# Patient Record
Sex: FEMALE | Race: OTHER | HISPANIC OR LATINO | URBAN - METROPOLITAN AREA
[De-identification: names, ages, dates, MRNs, and addresses within clinical notes are randomized per-mention and may not be internally consistent; named-entity substitution may affect disease eponyms.]

---

## 2020-10-01 ENCOUNTER — OFFICE VISIT (OUTPATIENT)
Dept: URBAN - METROPOLITAN AREA CLINIC 121 | Facility: CLINIC | Age: 60
End: 2020-10-01

## 2020-11-30 ENCOUNTER — OFFICE VISIT (OUTPATIENT)
Dept: URBAN - METROPOLITAN AREA CLINIC 121 | Facility: CLINIC | Age: 60
End: 2020-11-30

## 2021-08-18 ENCOUNTER — OFFICE VISIT (OUTPATIENT)
Dept: URBAN - METROPOLITAN AREA CLINIC 60 | Facility: CLINIC | Age: 61
End: 2021-08-18

## 2021-09-13 ENCOUNTER — OFFICE VISIT (OUTPATIENT)
Dept: URBAN - METROPOLITAN AREA CLINIC 63 | Facility: CLINIC | Age: 61
End: 2021-09-13

## 2021-09-14 ENCOUNTER — OFFICE VISIT (OUTPATIENT)
Dept: URBAN - METROPOLITAN AREA MEDICAL CENTER 14 | Facility: MEDICAL CENTER | Age: 61
End: 2021-09-14

## 2021-09-17 ENCOUNTER — OFFICE VISIT (OUTPATIENT)
Dept: URBAN - METROPOLITAN AREA CLINIC 63 | Facility: CLINIC | Age: 61
End: 2021-09-17

## 2021-09-20 ENCOUNTER — OFFICE VISIT (OUTPATIENT)
Dept: URBAN - METROPOLITAN AREA CLINIC 121 | Facility: CLINIC | Age: 61
End: 2021-09-20

## 2021-09-29 LAB
% SATURATION: (no result)
A-1 ANTITRYPSIN MUTATION: (no result)
ABSOLUTE BASOPHILS: (no result)
ABSOLUTE EOSINOPHILS: (no result)
ABSOLUTE LYMPHOCYTES: (no result)
ABSOLUTE MONOCYTES: (no result)
ABSOLUTE NEUTROPHILS: (no result)
ALBUMIN/GLOBULIN RATIO: (no result)
ALBUMIN: (no result)
ALKALINE PHOSPHATASE: (no result)
ALPHA FETOPROTEIN, TUMOR MARKER: (no result)
ALT: (no result)
AMMONIA (P): (no result)
ANACHOICE(R) SCREEN: POSITIVE
AST: (no result)
BASOPHILS: (no result)
BILIRUBIN, TOTAL: (no result)
BUN/CREATININE RATIO: (no result)
CALCIUM: (no result)
CARBON DIOXIDE: (no result)
CERULOPLASMIN: (no result)
CHLORIDE: (no result)
CREATININE: (no result)
DNA (DS) ANTIBODY: (no result)
EGFR AFRICAN AMERIC: (no result)
EGFR NON-AFR. AMERI: (no result)
EOSINOPHILS: (no result)
FERRITIN: (no result)
GLOBULIN: (no result)
GLUCOSE: (no result)
HCV RNA, QUANTITATIVE REAL TI: (no result)
HCV RNA, QUANTITATIVE REAL TIME: (no result)
HEMATOCRIT: (no result)
HEMOGLOBIN: (no result)
HEPATITIS A AB, TOTAL: REACTIVE
HEPATITIS A IGM: (no result)
HEPATITIS B CORE AB TOTAL: (no result)
HEPATITIS B CORE ANTIBODY (IGM): (no result)
HEPATITIS B SURFACE ANTIBODY QL: (no result)
HEPATITIS B SURFACE ANTIGEN: (no result)
IMMUNOGLOBULIN A: (no result)
IMMUNOGLOBULIN G: (no result)
IMMUNOGLOBULIN M: (no result)
INR: 1.5
IRON BINDING CAPACITY: (no result)
IRON, TOTAL: (no result)
LYMPHOCYTES: (no result)
Lab: (no result)
MCH: (no result)
MCHC: (no result)
MCV: (no result)
MITOCHONDRIAL AB SCREEN: NEGATIVE
MONOCYTES: (no result)
MPV: (no result)
NEUTROPHILS: (no result)
PLATELET COUNT: (no result)
POTASSIUM: (no result)
PROTEIN, TOTAL: (no result)
PT: (no result)
RDW: (no result)
RED BLOOD CELL COUNT: (no result)
REFERRING PHYSICIAN: (no result)
SMOOTH MUSCLE AB SCREEN: NEGATIVE
SODIUM: (no result)
UREA NITROGEN (BUN): (no result)
WHITE BLOOD CELL COUNT: (no result)

## 2021-10-11 ENCOUNTER — OFFICE VISIT (OUTPATIENT)
Dept: URBAN - METROPOLITAN AREA CLINIC 63 | Facility: CLINIC | Age: 61
End: 2021-10-11

## 2021-10-18 ENCOUNTER — OFFICE VISIT (OUTPATIENT)
Dept: URBAN - METROPOLITAN AREA CLINIC 63 | Facility: CLINIC | Age: 61
End: 2021-10-18

## 2021-10-18 ENCOUNTER — OFFICE VISIT (OUTPATIENT)
Dept: URBAN - METROPOLITAN AREA MEDICAL CENTER 14 | Facility: MEDICAL CENTER | Age: 61
End: 2021-10-18

## 2021-10-27 ENCOUNTER — OFFICE VISIT (OUTPATIENT)
Dept: URBAN - METROPOLITAN AREA CLINIC 63 | Facility: CLINIC | Age: 61
End: 2021-10-27

## 2022-01-10 ENCOUNTER — LAB OUTSIDE AN ENCOUNTER (OUTPATIENT)
Dept: URBAN - METROPOLITAN AREA CLINIC 121 | Facility: CLINIC | Age: 62
End: 2022-01-10

## 2022-01-17 ENCOUNTER — OFFICE VISIT (OUTPATIENT)
Dept: URBAN - METROPOLITAN AREA MEDICAL CENTER 14 | Facility: MEDICAL CENTER | Age: 62
End: 2022-01-17

## 2022-02-12 LAB
ABSOLUTE BASOPHILS: (no result)
ABSOLUTE EOSINOPHILS: (no result)
ABSOLUTE LYMPHOCYTES: (no result)
ABSOLUTE MONOCYTES: (no result)
ABSOLUTE NEUTROPHILS: (no result)
ALBUMIN/GLOBULIN RATIO: (no result)
ALBUMIN: (no result)
ALKALINE PHOSPHATASE: (no result)
ALPHA FETOPROTEIN, TUMOR MARKER: (no result)
ALT: (no result)
AMMONIA (P): (no result)
AST: (no result)
BASOPHILS: (no result)
BILIRUBIN, TOTAL: (no result)
BUN/CREATININE RATIO: (no result)
CALCIUM: (no result)
CARBON DIOXIDE: (no result)
CHLORIDE: (no result)
CREATININE: (no result)
EGFR AFRICAN AMERIC: (no result)
EGFR NON-AFR. AMERI: (no result)
EOSINOPHILS: (no result)
GLOBULIN: (no result)
GLUCOSE: (no result)
HEMATOCRIT: (no result)
HEMOGLOBIN: (no result)
INR: 1.4
LYMPHOCYTES: (no result)
MCH: (no result)
MCHC: (no result)
MCV: (no result)
MONOCYTES: (no result)
MPV: (no result)
NEUTROPHILS: (no result)
PLATELET COUNT: (no result)
POTASSIUM: (no result)
PROTEIN, TOTAL: (no result)
PT: (no result)
RDW: (no result)
RED BLOOD CELL COUNT: (no result)
SODIUM: (no result)
UREA NITROGEN (BUN): (no result)
WHITE BLOOD CELL COUNT: (no result)

## 2022-02-21 ENCOUNTER — OFFICE VISIT (OUTPATIENT)
Dept: URBAN - METROPOLITAN AREA MEDICAL CENTER 14 | Facility: MEDICAL CENTER | Age: 62
End: 2022-02-21

## 2022-03-21 ENCOUNTER — OFFICE VISIT (OUTPATIENT)
Dept: URBAN - METROPOLITAN AREA MEDICAL CENTER 14 | Facility: MEDICAL CENTER | Age: 62
End: 2022-03-21

## 2022-03-28 ENCOUNTER — OFFICE VISIT (OUTPATIENT)
Dept: URBAN - METROPOLITAN AREA CLINIC 63 | Facility: CLINIC | Age: 62
End: 2022-03-28

## 2022-05-09 ENCOUNTER — OFFICE VISIT (OUTPATIENT)
Dept: URBAN - METROPOLITAN AREA CLINIC 63 | Facility: CLINIC | Age: 62
End: 2022-05-09

## 2022-07-09 ENCOUNTER — TELEPHONE ENCOUNTER (OUTPATIENT)
Dept: URBAN - METROPOLITAN AREA CLINIC 121 | Facility: CLINIC | Age: 62
End: 2022-07-09

## 2022-07-09 RX ORDER — PHENYLPROPANOLAMINE HCL 75 MG
TABLET, EXTENDED RELEASE ORAL ONCE A DAY
Refills: 0 | OUTPATIENT
Start: 2021-10-11 | End: 2022-03-28

## 2022-07-09 RX ORDER — PANTOPRAZOLE SODIUM 40 MG/1
TABLET, DELAYED RELEASE ORAL ONCE A DAY
Refills: 0 | OUTPATIENT
Start: 2022-03-28 | End: 2022-05-09

## 2022-07-09 RX ORDER — PANTOPRAZOLE SODIUM 40 MG/1
TABLET, DELAYED RELEASE ORAL
Refills: 0 | OUTPATIENT
Start: 2021-09-17 | End: 2021-10-11

## 2022-07-09 RX ORDER — LISINOPRIL AND HYDROCHLOROTHIAZIDE TABLETS 10; 12.5 MG/1; MG/1
TABLET ORAL
Refills: 0 | OUTPATIENT
Start: 2021-08-09 | End: 2021-08-18

## 2022-07-09 RX ORDER — LEVOTHYROXINE SODIUM 150 UG/1
TABLET ORAL ONCE A DAY
Refills: 0 | OUTPATIENT
Start: 2022-03-28 | End: 2022-05-09

## 2022-07-09 RX ORDER — HYDROCORTISONE 1 %
CREAM (GRAM) TOPICAL
Refills: 0 | OUTPATIENT
Start: 2021-07-22 | End: 2021-08-18

## 2022-07-09 RX ORDER — CITALOPRAM 20 MG/1
TABLET ORAL ONCE A DAY
Refills: 0 | OUTPATIENT
Start: 2021-08-18 | End: 2021-09-17

## 2022-07-09 RX ORDER — SIMVASTATIN 20 MG/1
TABLET, FILM COATED ORAL ONCE A DAY
Refills: 0 | OUTPATIENT
Start: 2021-08-18 | End: 2021-09-17

## 2022-07-09 RX ORDER — PANTOPRAZOLE SODIUM 40 MG/1
TABLET, DELAYED RELEASE ORAL ONCE A DAY
Refills: 0 | OUTPATIENT
Start: 2021-10-11 | End: 2022-03-28

## 2022-07-09 RX ORDER — LEVOTHYROXINE SODIUM 150 MCG
TABLET ORAL ONCE A DAY
Refills: 0 | OUTPATIENT
Start: 2021-10-11 | End: 2022-03-28

## 2022-07-09 RX ORDER — AMITRIPTYLINE HYDROCHLORIDE 50 MG/1
TABLET, FILM COATED ORAL TAKE AS DIRECTED
Refills: 0 | OUTPATIENT
Start: 2021-08-18 | End: 2021-09-17

## 2022-07-09 RX ORDER — LEVOTHYROXINE SODIUM 150 UG/1
TABLET ORAL ONCE A DAY
Refills: 0 | OUTPATIENT
Start: 2021-08-18 | End: 2022-03-28

## 2022-07-09 RX ORDER — METFORMIN HCL 1000 MG/1
TABLET ORAL TWICE A DAY
Refills: 0 | OUTPATIENT
Start: 2021-08-18 | End: 2021-09-17

## 2022-07-09 RX ORDER — CITALOPRAM 20 MG/1
TABLET ORAL
Refills: 0 | OUTPATIENT
Start: 2021-08-09 | End: 2021-08-18

## 2022-07-09 RX ORDER — AMITRIPTYLINE HYDROCHLORIDE 25 MG/1
TABLET, FILM COATED ORAL
Refills: 0 | OUTPATIENT
Start: 2022-03-28 | End: 2022-05-09

## 2022-07-09 RX ORDER — LACTULOSE 10 G/15ML
30 UNIT QID SOLUTION ORAL
Refills: 0 | OUTPATIENT
Start: 2021-09-15 | End: 2021-09-17

## 2022-07-09 RX ORDER — SUCRALFATE 1 G/1
TWICE A DAY TABLET ORAL TWICE A DAY
Refills: 0 | OUTPATIENT
Start: 2022-03-28 | End: 2022-03-28

## 2022-07-09 RX ORDER — HYDROCORTISONE ACETATE 25 MG
ONCE A DAY SUPPOSITORY, RECTAL RECTAL ONCE A DAY
Refills: 0 | OUTPATIENT
Start: 2015-11-09 | End: 2016-01-04

## 2022-07-09 RX ORDER — PHENYLPROPANOLAMINE HCL 75 MG
TABLET, EXTENDED RELEASE ORAL ONCE A DAY
Refills: 0 | OUTPATIENT
Start: 2022-03-28 | End: 2022-05-09

## 2022-07-09 RX ORDER — FUROSEMIDE 20 MG/1
ONCE A DAY TABLET ORAL ONCE A DAY
Refills: 0 | OUTPATIENT
Start: 2021-09-17 | End: 2021-12-01

## 2022-07-09 RX ORDER — FOLIC ACID 1 MG/1
TABLET ORAL ONCE A DAY
Refills: 0 | OUTPATIENT
Start: 2022-03-28 | End: 2022-05-09

## 2022-07-09 RX ORDER — RIFAXIMIN 550 MG/1
TWICE A DAY TABLET ORAL TWICE A DAY
Refills: 0 | OUTPATIENT
Start: 2021-10-29 | End: 2021-12-01

## 2022-07-09 RX ORDER — LISINOPRIL AND HYDROCHLOROTHIAZIDE TABLETS 10; 12.5 MG/1; MG/1
TABLET ORAL ONCE A DAY
Refills: 0 | OUTPATIENT
Start: 2021-08-18 | End: 2021-09-17

## 2022-07-09 RX ORDER — RIFAXIMIN 550 MG/1
TWICE A DAY TABLET ORAL TWICE A DAY
Refills: 0 | OUTPATIENT
Start: 2021-12-15 | End: 2021-12-01

## 2022-07-09 RX ORDER — AMITRIPTYLINE HYDROCHLORIDE 25 MG/1
TABLET, FILM COATED ORAL
Refills: 0 | OUTPATIENT
Start: 2021-09-17 | End: 2021-10-11

## 2022-07-09 RX ORDER — SPIRONOLACTONE 25 MG/1
ONCE A DAY TABLET ORAL ONCE A DAY
Refills: 2 | OUTPATIENT
Start: 2021-08-18 | End: 2021-09-17

## 2022-07-09 RX ORDER — AMITRIPTYLINE HYDROCHLORIDE 25 MG/1
TABLET, FILM COATED ORAL
Refills: 0 | OUTPATIENT
Start: 2021-10-11 | End: 2022-03-28

## 2022-07-09 RX ORDER — RIFAXIMIN 550 MG/1
TWICE A DAY TABLET ORAL TWICE A DAY
Refills: 0 | OUTPATIENT
Start: 2021-09-17 | End: 2021-10-11

## 2022-07-09 RX ORDER — FUROSEMIDE 20 MG/1
ONCE A DAY TABLET ORAL ONCE A DAY
Refills: 1 | OUTPATIENT
Start: 2021-08-18 | End: 2021-09-17

## 2022-07-09 RX ORDER — LEVOTHYROXINE SODIUM 150 MCG
TABLET ORAL ONCE A DAY
Refills: 0 | OUTPATIENT
Start: 2022-03-28 | End: 2022-03-28

## 2022-07-09 RX ORDER — RIFAXIMIN 550 MG/1
TWICE A DAY TABLET ORAL TWICE A DAY
Refills: 0 | OUTPATIENT
Start: 2021-09-13 | End: 2021-09-17

## 2022-07-09 RX ORDER — RIFAXIMIN 550 MG/1
TWICE A DAY TABLET ORAL TWICE A DAY
Refills: 0 | OUTPATIENT
Start: 2021-12-02 | End: 2021-12-01

## 2022-07-09 RX ORDER — FOLIC ACID 1 MG/1
TABLET ORAL
Refills: 0 | OUTPATIENT
Start: 2021-09-17 | End: 2021-10-11

## 2022-07-09 RX ORDER — RIFAXIMIN 550 MG/1
TWICE A DAY TABLET ORAL TWICE A DAY
Refills: 0 | OUTPATIENT
Start: 2021-09-17 | End: 2021-09-17

## 2022-07-09 RX ORDER — LACTULOSE 10 G/15ML
SOLUTION ORAL TAKE AS DIRECTED
Refills: 0 | OUTPATIENT
Start: 2021-10-04 | End: 2021-10-16

## 2022-07-09 RX ORDER — FOLIC ACID 1 MG/1
TABLET ORAL ONCE A DAY
Refills: 0 | OUTPATIENT
Start: 2021-10-11 | End: 2022-03-28

## 2022-07-09 RX ORDER — CLONIDINE HYDROCHLORIDE 0.3 MG/1
TABLET ORAL
Refills: 0 | OUTPATIENT
Start: 2021-08-09 | End: 2021-09-17

## 2022-07-09 RX ORDER — PREDNISONE 10 MG/1
TABLET ORAL
Refills: 0 | OUTPATIENT
Start: 2021-06-22 | End: 2021-08-18

## 2022-07-10 ENCOUNTER — TELEPHONE ENCOUNTER (OUTPATIENT)
Dept: URBAN - METROPOLITAN AREA CLINIC 121 | Facility: CLINIC | Age: 62
End: 2022-07-10

## 2022-07-10 RX ORDER — AMITRIPTYLINE HYDROCHLORIDE 25 MG/1
TABLET, FILM COATED ORAL
Refills: 0 | Status: ACTIVE | COMMUNITY
Start: 2022-05-09

## 2022-07-10 RX ORDER — PHENYLPROPANOLAMINE HCL 75 MG
TABLET, EXTENDED RELEASE ORAL ONCE A DAY
Refills: 0 | Status: ACTIVE | COMMUNITY
Start: 2022-05-09

## 2022-07-10 RX ORDER — RIFAXIMIN 550 MG/1
TWICE A DAY TABLET ORAL TWICE A DAY
Refills: 0 | Status: ACTIVE | COMMUNITY
Start: 2021-12-09

## 2022-07-10 RX ORDER — RIFAXIMIN 550 MG/1
TWICE A DAY TABLET ORAL TWICE A DAY
Refills: 0 | Status: ACTIVE | COMMUNITY
Start: 2021-12-21

## 2022-07-10 RX ORDER — SUCRALFATE 1 G/1
TWICE A DAY TABLET ORAL TWICE A DAY
Refills: 0 | Status: ACTIVE | COMMUNITY
Start: 2022-03-28

## 2022-07-10 RX ORDER — LEVOTHYROXINE SODIUM 125 UG/1
TABLET ORAL ONCE A DAY
Refills: 0 | Status: ACTIVE | COMMUNITY
Start: 2022-05-09

## 2022-07-10 RX ORDER — LACTULOSE 10 G/15ML
TAKE 15 ML TWICE A DAY SOLUTION ORAL TWICE A DAY
Refills: 0 | Status: ACTIVE | COMMUNITY
Start: 2021-08-18

## 2022-07-10 RX ORDER — SPIRONOLACTONE 25 MG/1
ONCE A DAY TABLET ORAL ONCE A DAY
Refills: 0 | Status: ACTIVE | COMMUNITY
Start: 2022-03-28

## 2022-07-10 RX ORDER — LACTULOSE 10 G/15ML
TAKE 30 ML TWICE A DAY SOLUTION ORAL TWICE A DAY
Refills: 0 | Status: ACTIVE | COMMUNITY
Start: 2021-09-17

## 2022-07-10 RX ORDER — PANTOPRAZOLE SODIUM 40 MG/1
TABLET, DELAYED RELEASE ORAL ONCE A DAY
Refills: 0 | Status: ACTIVE | COMMUNITY
Start: 2022-05-09

## 2022-07-10 RX ORDER — FUROSEMIDE 20 MG/1
ONCE A DAY TABLET ORAL ONCE A DAY
Refills: 0 | Status: ACTIVE | COMMUNITY
Start: 2022-03-28

## 2022-07-10 RX ORDER — HYDROCORTISONE ACETATE 25 MG
ONCE A DAY SUPPOSITORY, RECTAL RECTAL ONCE A DAY
Refills: 3 | Status: ACTIVE | COMMUNITY
Start: 2016-01-04

## 2022-07-10 RX ORDER — RIFAXIMIN 550 MG/1
TWICE A DAY TABLET ORAL TWICE A DAY
Refills: 0 | Status: ACTIVE | COMMUNITY
Start: 2021-10-29

## 2022-07-10 RX ORDER — FUROSEMIDE 20 MG/1
ONCE A DAY TABLET ORAL ONCE A DAY
Refills: 0 | Status: ACTIVE | COMMUNITY
Start: 2021-12-01

## 2022-07-10 RX ORDER — SPIRONOLACTONE 25 MG/1
ONCE A DAY TABLET ORAL ONCE A DAY
Refills: 0 | Status: ACTIVE | COMMUNITY
Start: 2021-09-17

## 2022-07-10 RX ORDER — FOLIC ACID 1 MG/1
TABLET ORAL ONCE A DAY
Refills: 0 | Status: ACTIVE | COMMUNITY
Start: 2022-05-09

## 2022-09-02 ENCOUNTER — TELEPHONE ENCOUNTER (OUTPATIENT)
Dept: URBAN - METROPOLITAN AREA CLINIC 63 | Facility: CLINIC | Age: 62
End: 2022-09-02

## 2022-09-20 ENCOUNTER — TELEPHONE ENCOUNTER (OUTPATIENT)
Dept: URBAN - METROPOLITAN AREA CLINIC 63 | Facility: CLINIC | Age: 62
End: 2022-09-20

## 2022-09-20 RX ORDER — SUCRALFATE 1 G/1
TWICE A DAY TABLET ORAL TWICE A DAY
Qty: 60 | Refills: 0

## 2022-09-27 ENCOUNTER — TELEPHONE ENCOUNTER (OUTPATIENT)
Dept: URBAN - METROPOLITAN AREA CLINIC 63 | Facility: CLINIC | Age: 62
End: 2022-09-27

## 2022-09-27 RX ORDER — SUCRALFATE 1 G/1
TWICE A DAY TABLET ORAL TWICE A DAY
Qty: 60 | Refills: 0
End: 2022-11-04

## 2022-10-31 ENCOUNTER — WEB ENCOUNTER (OUTPATIENT)
Dept: URBAN - METROPOLITAN AREA CLINIC 63 | Facility: CLINIC | Age: 62
End: 2022-10-31

## 2022-10-31 ENCOUNTER — OFFICE VISIT (OUTPATIENT)
Dept: URBAN - METROPOLITAN AREA CLINIC 63 | Facility: CLINIC | Age: 62
End: 2022-10-31
Payer: COMMERCIAL

## 2022-10-31 VITALS
HEIGHT: 62 IN | OXYGEN SATURATION: 99 % | DIASTOLIC BLOOD PRESSURE: 70 MMHG | BODY MASS INDEX: 22.74 KG/M2 | HEART RATE: 76 BPM | SYSTOLIC BLOOD PRESSURE: 124 MMHG | RESPIRATION RATE: 16 BRPM | TEMPERATURE: 97.6 F | WEIGHT: 123.6 LBS

## 2022-10-31 DIAGNOSIS — K74.60 HEPATIC CIRRHOSIS, UNSPECIFIED HEPATIC CIRRHOSIS TYPE, UNSPECIFIED WHETHER ASCITES PRESENT: ICD-10-CM

## 2022-10-31 PROCEDURE — 99214 OFFICE O/P EST MOD 30 MIN: CPT | Performed by: NURSE PRACTITIONER

## 2022-10-31 RX ORDER — LACTULOSE 10 G/15ML
15 ML SOLUTION ORAL
Qty: 1800 ML | Refills: 4 | OUTPATIENT
Start: 2022-11-03 | End: 2023-04-02

## 2022-10-31 RX ORDER — HYDROCORTISONE ACETATE 25 MG
ONCE A DAY SUPPOSITORY, RECTAL RECTAL ONCE A DAY
Refills: 3 | Status: ACTIVE | COMMUNITY
Start: 2016-01-04

## 2022-10-31 RX ORDER — SPIRONOLACTONE 25 MG/1
1 TABLET TABLET, FILM COATED ORAL ONCE A DAY
Qty: 90 TABLET | Refills: 2 | OUTPATIENT
Start: 2022-11-03 | End: 2023-07-31

## 2022-10-31 RX ORDER — PHENYLPROPANOLAMINE HCL 75 MG
TABLET, EXTENDED RELEASE ORAL ONCE A DAY
Refills: 0 | Status: ACTIVE | COMMUNITY
Start: 2022-05-09

## 2022-10-31 RX ORDER — SUCRALFATE 1 G/1
TWICE A DAY TABLET ORAL TWICE A DAY
Qty: 60 | Refills: 0 | Status: ACTIVE | COMMUNITY
End: 2022-11-04

## 2022-10-31 RX ORDER — PANTOPRAZOLE SODIUM 40 MG/1
TABLET, DELAYED RELEASE ORAL ONCE A DAY
Refills: 0 | Status: ACTIVE | COMMUNITY
Start: 2022-05-09

## 2022-10-31 RX ORDER — AMITRIPTYLINE HYDROCHLORIDE 25 MG/1
TABLET, FILM COATED ORAL
Refills: 0 | Status: ACTIVE | COMMUNITY
Start: 2022-05-09

## 2022-10-31 RX ORDER — FUROSEMIDE 20 MG/1
1 TABLET TABLET ORAL ONCE A DAY
Qty: 90 TABLET | Refills: 2 | OUTPATIENT
Start: 2022-11-03

## 2022-10-31 RX ORDER — SPIRONOLACTONE 25 MG/1
ONCE A DAY TABLET ORAL ONCE A DAY
Refills: 0 | Status: ACTIVE | COMMUNITY
Start: 2022-03-28

## 2022-10-31 RX ORDER — FOLIC ACID 1 MG/1
TABLET ORAL ONCE A DAY
Refills: 0 | Status: ACTIVE | COMMUNITY
Start: 2022-05-09

## 2022-10-31 RX ORDER — FUROSEMIDE 20 MG/1
ONCE A DAY TABLET ORAL ONCE A DAY
Refills: 0 | Status: ACTIVE | COMMUNITY
Start: 2021-12-01

## 2022-10-31 RX ORDER — RIFAXIMIN 550 MG/1
TWICE A DAY TABLET ORAL TWICE A DAY
Refills: 0 | Status: ACTIVE | COMMUNITY
Start: 2021-12-09

## 2022-10-31 RX ORDER — LEVOTHYROXINE SODIUM 125 UG/1
TABLET ORAL ONCE A DAY
Refills: 0 | Status: ACTIVE | COMMUNITY
Start: 2022-05-09

## 2022-10-31 RX ORDER — RIFAXIMIN 550 MG/1
1 TABLET TABLET ORAL TWICE A DAY
Qty: 180 TABLET | Refills: 2 | OUTPATIENT
Start: 2022-11-03 | End: 2023-07-31

## 2022-10-31 RX ORDER — AZATHIOPRINE
AS DIRECTED POWDER (GRAM) MISCELLANEOUS
Status: ACTIVE | COMMUNITY

## 2022-10-31 RX ORDER — LACTULOSE 10 G/15ML
TAKE 30 ML TWICE A DAY SOLUTION ORAL TWICE A DAY
Refills: 0 | Status: ACTIVE | COMMUNITY
Start: 2021-09-17

## 2022-10-31 NOTE — HPI-HPI
1/16 Patient is feeling well, is doing better with her constipation, with stools softener and diet, colonoscopy positive for internal hemorrhoids, one hyperplastic polyp and one tubular adenoma. 11/15 Patient comes for rectal bleeding, with occasional constipation, patient had a colonoscopy in 2009 in [place], as per patient was normal.  Patient with anemia will plan colonoscopy.   Patient is feeling well, is doing better with her constipation, with stools softener and diet, colonoscopy positive for internal hemorrhoids, one hyperplastic polyp and one tubular adenoma. 8/21 Patient presents to the emergency room 4 days ago complaining of lower abdominal pain and nausea.  Patient has medical history of hypertension, diabetes, and fatty liver,  an abdominal ultrasound that shows evidence of abnormal liver consistent with cirrhosis changes, and a small volume of ascites. Laboratories: Shows anemia hemoglobin 11.2, low sodium 134, elevated blood sugar 179, elevated , , total bilirubin 2.0, low albumin level 3.1, and protein total elevated 8.4. Patient also had a CT scan that showed similar findings. Her last colonoscopy was almost 6 years ago and shows evidence of internal hemorrhoids, a 5 mm tubular adenoma polyp into the cecum, and a 5 mm hyperplastic polyp into the descending colon. Today patient has a coherent conversation but, forgetful at times, looks very anxious, has lower extremity edema, abdomen is distended seems to have small volume of ascites.  Negative for symptoms of GI infection or acute GI bleeding.  Patient complain of feeling tired, sleepy she got loss drive into our office today. No Hx of alcohol abuse. Plan: Patient was instructed not to drive any vehicle for now, education about hepatic encephalopathy was given to her. We will start her on diuretic low-dose of furosemide 20 mg once a day and spironolactone 25 mg once a day.  Patient will have liver work-up which include laboratories below, liver Doppler, FibroScan.  EGD to rule out esophageal/gastric varices/portal gastropathy. Lactulose 15 mL  twice daily. Plan: Patient will have liver work-up, and surveillance colonoscopy, and EGD.  9/21 Patient here today accompanied by her daughter.  Patient recently was admitted to the intensive care unit [facility] with a diagnosis of acute metabolic encephalopathy. EGD showed evidence of portal gastropathy, hiatal hernia, esophageal varices grade 1, normal duodenum. Patient also underwent to diagnostic paracentesis, 0.9 L of ascites fluid was drainage, negative for SBP.  Patient was treated with cephalexin, antibiotic, lactulose, diuretics. Today patient is in good general state. I noted she is in better on her mental status, seems somewhat forgetful but, has a coherent conversation, no edemas, no sign of infection, no sign of acute GI bleeding at this time.  Positive for small amount of ascites. Plan: Continue Xifaxan, lactulose, spironolactone 25 mg with 20 mg of furosemide daily. Patient will complete FibroScan, liver Doppler, and laboratories to complete her liver work-up. With going to plan screening colonoscopy in 3 months.  10/21 FibroScan: Shows evidence of advanced fibrosis/cirrhosis (60 8.7K PA, 183 CAP score). Labs: Alpha-fetoprotein is negative, alpha-1 antitrypsin is negative, ammonia levels are normal 55, CBC platelet levels are normal 158, anemia hemoglobin 11.0 hematocrit 32.9.  liver enzymes, alkaline phosphatase, and bilirubin are elevated.  , ALT 59, bilirubin total 2.4, albumin 2.8 low, protein total 8.6.  Ferritin level 52, normal viral hepatitis panel negative, autoimmune hepatitis panel negative INR 1.5. Meld score: 16 EGD done a month ago shows evidence of esophageal varices grade I. CT liver 3 PH done 4 months ago shows evidence of early cirrhosis, negative for masses, minimal hiatal hernia. CT abdomen and pelvis with contrast done 2 months ago: Shows evidence of ascites and cirrhotic changes of the liver. Patient here today in good general state, she had normal for her conversation, no sign of acute GI bleeding, infection, edema, ascites. Plan: We will refer her to a tertiary center/hepatology center Continue Xifaxan 550 mg twice a day, furosemide, spironolactone, lactulose, patient on metoprolol. Surveillance esophageal varices EGD  3/22 Patient is here today in good general state, she had normal coherent conversation, no edema, no sign of infection, or GI bleeding.  Laboratory: Alpha-fetoprotein elevated 11.8.  Normal ammonia 57, CBC anemia hemoglobin 11.5, platelet count normal.  CMP: Normal blood sugar and kidney function.  Liver enzymes: , , alkaline phosphatase 353, bilirubin 1.8, low albumin 2.7, normal protein 7.2.  Meld score 10. Patient colonoscopy shows evidence of small internal hemorrhoids otherwise normal. EGD: Esophageal varices grade I /no banding needed. Portal gastropathy, small hiatal hernia, mild gastritis at the antrum with erosion, and normal duodenum. Biopsy results shows evidence of superficial fragment of benign gastric antral mucosa with no specific pathologic alteration, negative for H. pylori. Doppler ultrasound done on January 2022 shows evidence of liver cirrhosis with persistent small amount of ascites within the upper abdomen. No abnormality evident within the portal vessels. Plan: Patient with continue with diuretic, Xifaxan, will lower dose of lactulose to once a day, if patient tolerated we may DC it. Triphasic CT liver due to elevated alpha-fetoprotein. Keep well control of the diet, weight, sodium intake. Will refer to a hepatologist in [place]. 5/22 Patient here today in good general state.  She has a normal coherent conversation, there is no sign of edema, infection, ascites, or any GI bleeding.  Patient CT scanning abdomen and pelvis shows evidence of: No suspicious liver mass.  Liver cirrhosis, and changes in appearance of the left hepatic lobe, compared with a CT scan done in June 2021 with a wedge shaped region of atrophy and altered perfusion, of uncertain etiology.  The portal vein is currently patent, small volume of perihepatic ascites.  No significant sequela of portal hypertension.  Mild to moderate upper abdominal and retroperitoneal adenopathy, potentially related to chronic liver disease, although is more prominent than typically occur. Patient did visit [facility], she said will have a liver biopsy in a couple of week.  She also have follow-up laboratories to complete for them. She has an appointment for a PET scan ordered from her primary doctor. Plan: Patient will continue with lactulose and Xifaxan. We will follow-up her liver biopsy, laboratories, and PET scan. 11/22 Patient here today in good general state.  She has no GI complaints.  She is following her hip pathology in [place] hepathology center.  She said has a diagnosis of autoimmune hepatitis/liver cirrhosis.  She did a treatment with prednisone followed by treatment with azathioprine. Patient had normal: Conversation, no sign of GI bleeding, infection, edema, ascites.  Today accompanied by her  who simply noticed some mental confusion on her, at times.  Patient will continue with treatment Xifaxan, spironolactone, furosemide, lactulose.  We will obtain medical records from [place].

## 2022-11-01 ENCOUNTER — ERX REFILL RESPONSE (OUTPATIENT)
Dept: URBAN - METROPOLITAN AREA CLINIC 63 | Facility: CLINIC | Age: 62
End: 2022-11-01

## 2022-11-01 RX ORDER — SUCRALFATE 1 G/1
TWICE A DAY TABLET ORAL TWICE A DAY
Qty: 60 | Refills: 0 | OUTPATIENT

## 2022-11-01 RX ORDER — SUCRALFATE 1 G/1
1 TABLET ON AN EMPTY STOMACH TABLET ORAL TWICE A DAY
Qty: 60 | Refills: 1 | OUTPATIENT

## 2022-11-01 RX ORDER — SUCRALFATE 1 G/1
1 TABLET ON AN EMPTY STOMACH TABLET ORAL TWICE A DAY
Qty: 60 | Refills: 0 | OUTPATIENT

## 2022-11-02 ENCOUNTER — ERX REFILL RESPONSE (OUTPATIENT)
Dept: URBAN - METROPOLITAN AREA CLINIC 63 | Facility: CLINIC | Age: 62
End: 2022-11-02

## 2022-11-02 RX ORDER — SUCRALFATE 1 G/1
1 TABLET ON AN EMPTY STOMACH TABLET ORAL TWICE A DAY
Qty: 60 | Refills: 0 | OUTPATIENT

## 2022-11-02 RX ORDER — SUCRALFATE 1 G/1
1 TABLET ON AN EMPTY STOMACH TABLET ORAL TWICE A DAY
Qty: 60 TABLET | Refills: 0 | OUTPATIENT

## 2022-11-02 RX ORDER — SUCRALFATE 1 G/1
1 TABLET ON AN EMPTY STOMACH TABLET ORAL TWICE A DAY
Qty: 60 | Refills: 1 | OUTPATIENT

## 2022-11-03 PROBLEM — 19943007: Status: ACTIVE | Noted: 2022-10-31

## 2023-01-04 ENCOUNTER — TELEPHONE ENCOUNTER (OUTPATIENT)
Dept: URBAN - METROPOLITAN AREA CLINIC 63 | Facility: CLINIC | Age: 63
End: 2023-01-04

## 2023-02-24 LAB
A/G RATIO: 1.4
ABSOLUTE BASOPHILS: 39
ABSOLUTE EOSINOPHILS: 182
ABSOLUTE LYMPHOCYTES: 1931
ABSOLUTE MONOCYTES: 644
ABSOLUTE NEUTROPHILS: 2706
AFP, SERUM, TUMOR MARKER: 10.6
ALBUMIN: 4.2
ALKALINE PHOSPHATASE: 193
ALT (SGPT): 38
AST (SGOT): 47
BASOPHILS: 0.7
BILIRUBIN, TOTAL: 0.6
BUN/CREATININE RATIO: 27
BUN: 29
CALCIUM: 9.4
CARBON DIOXIDE, TOTAL: 24
CHLORIDE: 107
CREATININE: 1.09
EGFR: 56
EOSINOPHILS: 3.3
GLOBULIN, TOTAL: 2.9
GLUCOSE: 73
HEMATOCRIT: 33.3
HEMOGLOBIN: 10.5
INR: 1.2
LYMPHOCYTES: 35.1
MCH: 27.4
MCHC: 31.5
MCV: 86.9
MONOCYTES: 11.7
MPV: 11.9
NEUTROPHILS: 49.2
PLATELET COUNT: 112
POTASSIUM: 3.8
PROTEIN, TOTAL: 7.1
PT: 11.6
RDW: 16
RED BLOOD CELL COUNT: 3.83
SODIUM: 142
WHITE BLOOD CELL COUNT: 5.5

## 2023-03-13 ENCOUNTER — LAB OUTSIDE AN ENCOUNTER (OUTPATIENT)
Dept: URBAN - METROPOLITAN AREA CLINIC 63 | Facility: CLINIC | Age: 63
End: 2023-03-13

## 2023-03-13 ENCOUNTER — OFFICE VISIT (OUTPATIENT)
Dept: URBAN - METROPOLITAN AREA CLINIC 63 | Facility: CLINIC | Age: 63
End: 2023-03-13
Payer: COMMERCIAL

## 2023-03-13 VITALS
BODY MASS INDEX: 22.78 KG/M2 | WEIGHT: 123.8 LBS | DIASTOLIC BLOOD PRESSURE: 72 MMHG | HEIGHT: 62 IN | TEMPERATURE: 97.3 F | HEART RATE: 69 BPM | OXYGEN SATURATION: 99 % | SYSTOLIC BLOOD PRESSURE: 120 MMHG

## 2023-03-13 DIAGNOSIS — K74.69 OTHER CIRRHOSIS OF LIVER: ICD-10-CM

## 2023-03-13 DIAGNOSIS — K75.4 AUTOIMMUNE HEPATITIS: ICD-10-CM

## 2023-03-13 PROBLEM — 408335007: Status: ACTIVE | Noted: 2023-03-13

## 2023-03-13 PROCEDURE — 99214 OFFICE O/P EST MOD 30 MIN: CPT | Performed by: NURSE PRACTITIONER

## 2023-03-13 RX ORDER — SPIRONOLACTONE 25 MG/1
ONCE A DAY TABLET ORAL ONCE A DAY
Refills: 0 | Status: ACTIVE | COMMUNITY
Start: 2022-03-28

## 2023-03-13 RX ORDER — LACTULOSE 10 G/15ML
15 ML SOLUTION ORAL
Qty: 1800 ML | Refills: 4 | Status: ACTIVE | COMMUNITY
Start: 2022-11-03 | End: 2023-04-02

## 2023-03-13 RX ORDER — PANTOPRAZOLE SODIUM 40 MG/1
TABLET, DELAYED RELEASE ORAL ONCE A DAY
Refills: 0 | Status: ACTIVE | COMMUNITY
Start: 2022-05-09

## 2023-03-13 RX ORDER — FUROSEMIDE 20 MG/1
ONCE A DAY TABLET ORAL ONCE A DAY
Refills: 0 | Status: ACTIVE | COMMUNITY
Start: 2021-12-01

## 2023-03-13 RX ORDER — AZATHIOPRINE
AS DIRECTED POWDER (GRAM) MISCELLANEOUS
Status: ACTIVE | COMMUNITY

## 2023-03-13 RX ORDER — HYDROCORTISONE ACETATE 25 MG
ONCE A DAY SUPPOSITORY, RECTAL RECTAL ONCE A DAY
Refills: 3 | Status: ACTIVE | COMMUNITY
Start: 2016-01-04

## 2023-03-13 RX ORDER — FUROSEMIDE 20 MG/1
1 TABLET TABLET ORAL ONCE A DAY
Qty: 90 TABLET | Refills: 2 | Status: ACTIVE | COMMUNITY
Start: 2022-11-03

## 2023-03-13 RX ORDER — AMITRIPTYLINE HYDROCHLORIDE 25 MG/1
TABLET, FILM COATED ORAL
Refills: 0 | Status: ACTIVE | COMMUNITY
Start: 2022-05-09

## 2023-03-13 RX ORDER — LACTULOSE 10 G/15ML
15 ML AS NEEDED SOLUTION ORAL ONCE A DAY
Qty: 450 | OUTPATIENT
Start: 2023-03-13 | End: 2023-04-12

## 2023-03-13 RX ORDER — FOLIC ACID 1 MG/1
TABLET ORAL ONCE A DAY
Refills: 0 | Status: ACTIVE | COMMUNITY
Start: 2022-05-09

## 2023-03-13 RX ORDER — SPIRONOLACTONE 25 MG/1
1 TABLET TABLET, FILM COATED ORAL ONCE A DAY
Qty: 90 TABLET | Refills: 2 | Status: ACTIVE | COMMUNITY
Start: 2022-11-03 | End: 2023-07-31

## 2023-03-13 RX ORDER — SUCRALFATE 1 G/1
1 TABLET ON AN EMPTY STOMACH TABLET ORAL TWICE A DAY
Qty: 60 | Refills: 0 | Status: ACTIVE | COMMUNITY

## 2023-03-13 RX ORDER — RIFAXIMIN 550 MG/1
1 TABLET TABLET ORAL TWICE A DAY
Qty: 180 TABLET | Refills: 2 | Status: ACTIVE | COMMUNITY
Start: 2022-11-03 | End: 2023-07-31

## 2023-03-13 RX ORDER — URSODIOL 250 MG/1
1 TABLET WITH FOOD TABLET ORAL TWICE A DAY
Qty: 180 TABLET | Refills: 0 | OUTPATIENT
Start: 2023-03-13

## 2023-03-13 RX ORDER — PHENYLPROPANOLAMINE HCL 75 MG
TABLET, EXTENDED RELEASE ORAL ONCE A DAY
Refills: 0 | Status: ACTIVE | COMMUNITY
Start: 2022-05-09

## 2023-03-13 RX ORDER — LACTULOSE 10 G/15ML
TAKE 30 ML TWICE A DAY SOLUTION ORAL TWICE A DAY
Refills: 0 | Status: ACTIVE | COMMUNITY
Start: 2021-09-17

## 2023-03-13 RX ORDER — FUROSEMIDE 20 MG/1
1 TABLET TABLET ORAL ONCE A DAY
Qty: 30 | OUTPATIENT
Start: 2023-03-13

## 2023-03-13 RX ORDER — RIFAXIMIN 550 MG/1
TWICE A DAY TABLET ORAL TWICE A DAY
Refills: 0 | Status: ACTIVE | COMMUNITY
Start: 2021-12-09

## 2023-03-13 RX ORDER — RIFAXIMIN 550 MG/1
1 TABLET TABLET ORAL TWICE A DAY
Qty: 60 | OUTPATIENT
Start: 2023-03-13 | End: 2023-04-12

## 2023-03-13 RX ORDER — URSODIOL 250 MG/1
1 TABLET WITH FOOD TABLET ORAL TWICE A DAY
Status: ACTIVE | COMMUNITY

## 2023-03-13 RX ORDER — LEVOTHYROXINE SODIUM 125 UG/1
TABLET ORAL ONCE A DAY
Refills: 0 | Status: ACTIVE | COMMUNITY
Start: 2022-05-09

## 2023-03-13 NOTE — PHYSICAL EXAM NECK/THYROID:
Normal appearance, without tenderness upon palpation, no deformities, trachea midline, no masses , thyroid nontender.

## 2023-03-13 NOTE — PHYSICAL EXAM CHEST:
No lesions,  no deformities, chest wall non-tender,  no masses present, breathing is unlabored without, normal breath sounds.

## 2023-03-13 NOTE — PHYSICAL EXAM HENT:
Head,  normocephalic,  atraumatic,  Face, within normal limits,  Ears,  External ears within normal limits,  Nose/Nasopharynx,  External nose  normal appearance, no nasal discharge,  Mouth and Throat,  Oral cavity appearance normal. Mucosa normal. Lips,  Appearance normal

## 2023-03-13 NOTE — HPI-HPI
1/16 Patient is feeling well, is doing better with her constipation, with stools softener and diet, colonoscopy positive for internal hemorrhoids, one hyperplastic polyp and one tubular adenoma. 11/15 Patient comes for rectal bleeding, with occasional constipation, patient had a colonoscopy in 2009 in [place], as per patient was normal.  Patient with anemia will plan colonoscopy.   Patient is feeling well, is doing better with her constipation, with stools softener and diet, colonoscopy positive for internal hemorrhoids, one hyperplastic polyp and one tubular adenoma. 8/21 Patient presents to the emergency room 4 days ago complaining of lower abdominal pain and nausea.  Patient has medical history of hypertension, diabetes, and fatty liver,  an abdominal ultrasound that shows evidence of abnormal liver consistent with cirrhosis changes, and a small volume of ascites. Laboratories: Shows anemia hemoglobin 11.2, low sodium 134, elevated blood sugar 179, elevated , , total bilirubin 2.0, low albumin level 3.1, and protein total elevated 8.4. Patient also had a CT scan that showed similar findings. Her last colonoscopy was almost 6 years ago and shows evidence of internal hemorrhoids, a 5 mm tubular adenoma polyp into the cecum, and a 5 mm hyperplastic polyp into the descending colon. Today patient has a coherent conversation but, forgetful at times, looks very anxious, has lower extremity edema, abdomen is distended seems to have small volume of ascites.  Negative for symptoms of GI infection or acute GI bleeding.  Patient complain of feeling tired, sleepy she got loss drive into our office today. No Hx of alcohol abuse. Plan: Patient was instructed not to drive any vehicle for now, education about hepatic encephalopathy was given to her. We will start her on diuretic low-dose of furosemide 20 mg once a day and spironolactone 25 mg once a day.  Patient will have liver work-up which include laboratories below, liver Doppler, FibroScan.  EGD to rule out esophageal/gastric varices/portal gastropathy. Lactulose 15 mL  twice daily. Plan: Patient will have liver work-up, and surveillance colonoscopy, and EGD.  9/21 Patient here today accompanied by her daughter.  Patient recently was admitted to the intensive care unit [facility] with a diagnosis of acute metabolic encephalopathy. EGD showed evidence of portal gastropathy, hiatal hernia, esophageal varices grade 1, normal duodenum. Patient also underwent to diagnostic paracentesis, 0.9 L of ascites fluid was drainage, negative for SBP.  Patient was treated with cephalexin, antibiotic, lactulose, diuretics. Today patient is in good general state. I noted she is in better on her mental status, seems somewhat forgetful but, has a coherent conversation, no edemas, no sign of infection, no sign of acute GI bleeding at this time.  Positive for small amount of ascites. Plan: Continue Xifaxan, lactulose, spironolactone 25 mg with 20 mg of furosemide daily. Patient will complete FibroScan, liver Doppler, and laboratories to complete her liver work-up. With going to plan screening colonoscopy in 3 months.  10/21 FibroScan: Shows evidence of advanced fibrosis/cirrhosis (60 8.7K PA, 183 CAP score). Labs: Alpha-fetoprotein is negative, alpha-1 antitrypsin is negative, ammonia levels are normal 55, CBC platelet levels are normal 158, anemia hemoglobin 11.0 hematocrit 32.9.  Liver enzymes, alkaline phosphatase, and bilirubin are elevated.  , ALT 59, bilirubin total 2.4, albumin 2.8 low, protein total 8.6.  Ferritin level 52, normal viral hepatitis panel negative, autoimmune hepatitis panel negative INR 1.5. Meld score: 16 EGD done a month ago shows evidence of esophageal varices grade I. CT liver 3 PH done 4 months ago shows evidence of early cirrhosis, negative for masses, minimal hiatal hernia. CT abdomen and pelvis with contrast done 2 months ago: Shows evidence of ascites and cirrhotic changes of the liver. Patient here today in good general state, she had normal for her conversation, no sign of acute GI bleeding, infection, edema, ascites. Plan: We will refer her to a tertiary center/hepatology center Continue Xifaxan 550 mg twice a day, furosemide, spironolactone, lactulose, patient on metoprolol. Surveillance esophageal varices EGD  3/22 Patient is here today in good general state, she had normal coherent conversation, no edema, no sign of infection, or GI bleeding.  Laboratory: Alpha-fetoprotein elevated 11.8.  Normal ammonia 57, CBC anemia hemoglobin 11.5, platelet count normal.  CMP: Normal blood sugar and kidney function.  Liver enzymes: , , alkaline phosphatase 353, bilirubin 1.8, low albumin 2.7, normal protein 7.2.  Meld score 10. Patient colonoscopy shows evidence of small internal hemorrhoids otherwise normal. EGD: Esophageal varices grade I /no banding needed. Portal gastropathy, small hiatal hernia, mild gastritis at the antrum with erosion, and normal duodenum. Biopsy results shows evidence of superficial fragment of benign gastric antral mucosa with no specific pathologic alteration, negative for H. pylori. Doppler's ultrasound done on January 2022 shows evidence of liver cirrhosis with persistent small amount of ascites within the upper abdomen. No abnormality evident within the portal vessels. Plan: Patient with continue with diuretic, Xifaxan, will lower dose of lactulose to once a day, if patient tolerated we may DC it. Triphasic CT liver due to elevated alpha-fetoprotein. Keep well control of the diet, weight, sodium intake. Will refer to a hepatologist in [place]. 5/22 Patient here today in good general state.  She has a normal coherent conversation, there is no sign of edema, infection, ascites, or any GI bleeding.  Patient CT scanning abdomen and pelvis shows evidence of: No suspicious liver mass.  Liver cirrhosis, and changes in appearance of the left hepatic lobe, compared with a CT scan done in June 2021 with a wedge shaped region of atrophy and altered perfusion, of uncertain etiology.  The portal vein is currently patent, small volume of perihepatic ascites.  No significant sequela of portal hypertension.  Mild to moderate upper abdominal and retroperitoneal adenopathy, potentially related to chronic liver disease, although is more prominent than typically occur. Patient did visit [facility], she said will have a liver biopsy in a couple of weeks.  She also has follow-up laboratories to complete for them. She has an appointment for a PET scan ordered from her primary doctor. Plan: Patient will continue with lactulose and Xifaxan. We will follow-up her liver biopsy, laboratories, and PET scan. 11/22 Patient here today in good general state.  She has no GI complaints.  She is following her hip pathology in [place] hepatology center.  She said has a diagnosis of autoimmune hepatitis/liver cirrhosis.  She did a treatment with prednisone followed by treatment with azathioprine. Patient had normal: Conversation, no sign of GI bleeding, infection, edema, ascites.  Today accompanied by her  who simply noticed some mental confusion on her, at times.  Patient will continue with treatment Xifaxan, spironolactone, furosemide, lactulose.  We will obtain medical records from [place].  3/23 Patient is here today in a good general state, she has medical history of autoimmune hepatitis with liver cirrhosis.  She has been followed by her hepatology in [place].  She is doing treatment with azathioprine She was stated with a new treatment Ursodiol 200 mg twice a day. Azathioprine 50 mg dose was lowered to half and it will be DC'd soon.  Patient has a normal conversation, there is no sign of ascites, infection, or GI bleeding.  She will continue with her present treatment listed below.  She also will need a 4 phase MRI liver as a hepatocellular carcinoma surveillance protocol.  She also will complete laboratories.

## 2023-03-13 NOTE — PHYSICAL EXAM GASTROINTESTINAL
Abdomen: Soft, nontender, none distended, no guarding or rigidity, no masses palpable, normal bowel sounds, no hepatosplenomegaly.

## 2023-03-24 ENCOUNTER — ERX REFILL RESPONSE (OUTPATIENT)
Dept: URBAN - METROPOLITAN AREA CLINIC 63 | Facility: CLINIC | Age: 63
End: 2023-03-24

## 2023-03-24 RX ORDER — LACTULOSE 10 G/15ML
TAKE 15 ML BY MOUTH DAILY AS NEEDED SOLUTION ORAL
Qty: 1350 MILLILITER | Refills: 1 | OUTPATIENT

## 2023-03-31 ENCOUNTER — TELEPHONE ENCOUNTER (OUTPATIENT)
Dept: URBAN - METROPOLITAN AREA CLINIC 63 | Facility: CLINIC | Age: 63
End: 2023-03-31

## 2023-04-04 ENCOUNTER — ERX REFILL RESPONSE (OUTPATIENT)
Dept: URBAN - METROPOLITAN AREA CLINIC 63 | Facility: CLINIC | Age: 63
End: 2023-04-04

## 2023-04-04 RX ORDER — METOPROLOL SUCCINATE 25 MG/1
TAKE 1 CAPSULE BY MOUTH EVERY DAY FOR 30 DAYS CAPSULE, EXTENDED RELEASE ORAL
Qty: 90 CAPSULE | Refills: 1 | OUTPATIENT

## 2023-04-27 ENCOUNTER — TELEPHONE ENCOUNTER (OUTPATIENT)
Dept: URBAN - METROPOLITAN AREA CLINIC 64 | Facility: CLINIC | Age: 63
End: 2023-04-27

## 2023-05-15 ENCOUNTER — TELEPHONE ENCOUNTER (OUTPATIENT)
Dept: URBAN - METROPOLITAN AREA CLINIC 63 | Facility: CLINIC | Age: 63
End: 2023-05-15

## 2023-05-18 ENCOUNTER — TELEPHONE ENCOUNTER (OUTPATIENT)
Dept: URBAN - METROPOLITAN AREA CLINIC 63 | Facility: CLINIC | Age: 63
End: 2023-05-18

## 2023-07-03 ENCOUNTER — OFFICE VISIT (OUTPATIENT)
Dept: URBAN - METROPOLITAN AREA CLINIC 63 | Facility: CLINIC | Age: 63
End: 2023-07-03

## 2023-07-14 LAB
A/G RATIO: 1.4
AFP, SERUM: 5.9
AFP-L3%, SERUM: 10.2
ALBUMIN: 4.2
ALKALINE PHOSPHATASE: 151
ALT (SGPT): 34
AST (SGOT): 40
BILIRUBIN, TOTAL: 0.5
BUN/CREATININE RATIO: (no result)
BUN: 25
CALCIUM: 9.4
CARBON DIOXIDE, TOTAL: 28
CHLORIDE: 103
CHOL/HDLC RATIO: 2.7
CHOLESTEROL, TOTAL: 176
CREATININE: 0.8
EGFR: 82
GLOBULIN, TOTAL: 2.9
GLUCOSE: 78
HDL CHOLESTEROL: 65
INR: 1.2
LDL CHOLESTEROL CALC: 85
NON HDL CHOLESTEROL: 111
POTASSIUM: 4.1
PROTEIN, TOTAL: 7.1
PT: 12.2
SODIUM: 140
TRIGLYCERIDES: 154

## 2023-07-24 ENCOUNTER — ERX REFILL RESPONSE (OUTPATIENT)
Dept: URBAN - METROPOLITAN AREA CLINIC 63 | Facility: CLINIC | Age: 63
End: 2023-07-24

## 2023-07-24 RX ORDER — URSODIOL 250 MG/1
TAKE 1 TABLET BY MOUTH TWICE A DAY WITH FOOD FOR 90 DAYS TABLET ORAL
Qty: 180 TABLET | Refills: 0 | OUTPATIENT

## 2023-07-24 RX ORDER — URSODIOL 250 MG/1
1 TABLET WITH FOOD TABLET ORAL TWICE A DAY
Qty: 180 TABLET | Refills: 0 | OUTPATIENT

## 2023-07-27 ENCOUNTER — ERX REFILL RESPONSE (OUTPATIENT)
Dept: URBAN - METROPOLITAN AREA CLINIC 63 | Facility: CLINIC | Age: 63
End: 2023-07-27

## 2023-07-27 RX ORDER — URSODIOL 250 MG/1
TAKE 1 TABLET BY MOUTH TWICE A DAY WITH FOOD FOR 90 DAYS TABLET ORAL
Qty: 180 TABLET | Refills: 1 | OUTPATIENT

## 2023-07-31 ENCOUNTER — OFFICE VISIT (OUTPATIENT)
Dept: URBAN - METROPOLITAN AREA CLINIC 63 | Facility: CLINIC | Age: 63
End: 2023-07-31
Payer: COMMERCIAL

## 2023-07-31 ENCOUNTER — DASHBOARD ENCOUNTERS (OUTPATIENT)
Age: 63
End: 2023-07-31

## 2023-07-31 ENCOUNTER — WEB ENCOUNTER (OUTPATIENT)
Dept: URBAN - METROPOLITAN AREA CLINIC 63 | Facility: CLINIC | Age: 63
End: 2023-07-31

## 2023-07-31 VITALS
WEIGHT: 123 LBS | OXYGEN SATURATION: 99 % | BODY MASS INDEX: 22.63 KG/M2 | HEIGHT: 62 IN | HEART RATE: 84 BPM | SYSTOLIC BLOOD PRESSURE: 104 MMHG | TEMPERATURE: 96.3 F | DIASTOLIC BLOOD PRESSURE: 80 MMHG

## 2023-07-31 DIAGNOSIS — K74.69 OTHER CIRRHOSIS OF LIVER: ICD-10-CM

## 2023-07-31 DIAGNOSIS — I85.10 SECONDARY ESOPHAGEAL VARICES WITHOUT BLEEDING: ICD-10-CM

## 2023-07-31 DIAGNOSIS — K75.4 AUTOIMMUNE HEPATITIS: ICD-10-CM

## 2023-07-31 DIAGNOSIS — K31.9 GASTROPATHY: ICD-10-CM

## 2023-07-31 PROBLEM — 14223005: Status: ACTIVE | Noted: 2023-07-31

## 2023-07-31 PROCEDURE — 99214 OFFICE O/P EST MOD 30 MIN: CPT | Performed by: NURSE PRACTITIONER

## 2023-07-31 RX ORDER — AMITRIPTYLINE HYDROCHLORIDE 25 MG/1
TABLET, FILM COATED ORAL
Refills: 0 | Status: ACTIVE | COMMUNITY
Start: 2022-05-09

## 2023-07-31 RX ORDER — PHENYLPROPANOLAMINE HCL 75 MG
TABLET, EXTENDED RELEASE ORAL ONCE A DAY
Refills: 0 | Status: ACTIVE | COMMUNITY
Start: 2022-05-09

## 2023-07-31 RX ORDER — LACTULOSE 10 G/15ML
TAKE 15 ML BY MOUTH DAILY AS NEEDED SOLUTION ORAL
Qty: 1350 MILLILITER | Refills: 1 | Status: ACTIVE | COMMUNITY

## 2023-07-31 RX ORDER — PANTOPRAZOLE SODIUM 40 MG/1
TABLET, DELAYED RELEASE ORAL ONCE A DAY
Refills: 0 | Status: ACTIVE | COMMUNITY
Start: 2022-05-09

## 2023-07-31 RX ORDER — RIFAXIMIN 550 MG/1
1 TABLET TABLET ORAL TWICE A DAY
Qty: 180 TABLET | Refills: 0 | OUTPATIENT
Start: 2023-07-31 | End: 2023-10-29

## 2023-07-31 RX ORDER — SPIRONOLACTONE 25 MG/1
1 TABLET TABLET, FILM COATED ORAL ONCE A DAY
Qty: 90 TABLET | Refills: 0 | OUTPATIENT
Start: 2023-07-31 | End: 2023-10-29

## 2023-07-31 RX ORDER — URSODIOL 250 MG/1
TAKE 1 TABLET BY MOUTH TWICE A DAY WITH FOOD FOR 90 DAYS TABLET ORAL
Qty: 180 TABLET | Refills: 0 | Status: ACTIVE | COMMUNITY

## 2023-07-31 RX ORDER — LACTULOSE 10 G/15ML
15 ML SOLUTION ORAL
Qty: 1800 MILLILITER | Refills: 3 | OUTPATIENT
Start: 2023-07-31 | End: 2023-10-29

## 2023-07-31 RX ORDER — AZATHIOPRINE 50 MG/1
AS DIRECTED TABLET ORAL
Qty: 90 TABLETS | Refills: 0 | OUTPATIENT
Start: 2023-07-31

## 2023-07-31 RX ORDER — FUROSEMIDE 20 MG/1
1 TABLET TABLET ORAL ONCE A DAY
Qty: 90 TABLET | Refills: 2 | Status: ACTIVE | COMMUNITY
Start: 2022-11-03

## 2023-07-31 RX ORDER — SPIRONOLACTONE 25 MG/1
1 TABLET TABLET, FILM COATED ORAL ONCE A DAY
Qty: 90 TABLET | Refills: 2 | Status: ACTIVE | COMMUNITY
Start: 2022-11-03 | End: 2023-07-31

## 2023-07-31 RX ORDER — SPIRONOLACTONE 25 MG/1
ONCE A DAY TABLET ORAL ONCE A DAY
Refills: 0 | Status: ACTIVE | COMMUNITY
Start: 2022-03-28

## 2023-07-31 RX ORDER — FUROSEMIDE 20 MG/1
1 TABLET TABLET ORAL ONCE A DAY
Qty: 30 | Status: ACTIVE | COMMUNITY
Start: 2023-03-13

## 2023-07-31 RX ORDER — RIFAXIMIN 550 MG/1
TWICE A DAY TABLET ORAL TWICE A DAY
Refills: 0 | Status: ACTIVE | COMMUNITY
Start: 2021-12-09

## 2023-07-31 RX ORDER — METOPROLOL SUCCINATE 25 MG/1
1 TABLET TABLET, FILM COATED, EXTENDED RELEASE ORAL
Qty: 90 TABLETS | Refills: 0 | OUTPATIENT
Start: 2023-07-31

## 2023-07-31 RX ORDER — LACTULOSE 10 G/15ML
TAKE 30 ML TWICE A DAY SOLUTION ORAL TWICE A DAY
Refills: 0 | Status: ACTIVE | COMMUNITY
Start: 2021-09-17

## 2023-07-31 RX ORDER — AZATHIOPRINE
AS DIRECTED POWDER (GRAM) MISCELLANEOUS
Status: ACTIVE | COMMUNITY

## 2023-07-31 RX ORDER — METOPROLOL SUCCINATE 25 MG/1
TAKE 1 CAPSULE BY MOUTH EVERY DAY FOR 30 DAYS CAPSULE, EXTENDED RELEASE ORAL
Qty: 90 CAPSULE | Refills: 1 | Status: ACTIVE | COMMUNITY

## 2023-07-31 RX ORDER — FUROSEMIDE 20 MG/1
1 TABLET TABLET ORAL ONCE A DAY
Qty: 90 TABLET | Refills: 0 | OUTPATIENT
Start: 2023-07-31

## 2023-07-31 RX ORDER — RIFAXIMIN 550 MG/1
1 TABLET TABLET ORAL TWICE A DAY
Qty: 180 TABLET | Refills: 2 | Status: ACTIVE | COMMUNITY
Start: 2022-11-03 | End: 2023-07-31

## 2023-07-31 RX ORDER — LEVOTHYROXINE SODIUM 125 UG/1
TABLET ORAL ONCE A DAY
Refills: 0 | Status: ACTIVE | COMMUNITY
Start: 2022-05-09

## 2023-07-31 RX ORDER — FOLIC ACID 1 MG/1
TABLET ORAL ONCE A DAY
Refills: 0 | Status: ACTIVE | COMMUNITY
Start: 2022-05-09

## 2023-07-31 RX ORDER — HYDROCORTISONE ACETATE 25 MG
ONCE A DAY SUPPOSITORY, RECTAL RECTAL ONCE A DAY
Refills: 3 | Status: ACTIVE | COMMUNITY
Start: 2016-01-04

## 2023-07-31 RX ORDER — FUROSEMIDE 20 MG/1
ONCE A DAY TABLET ORAL ONCE A DAY
Refills: 0 | Status: ACTIVE | COMMUNITY
Start: 2021-12-01

## 2023-07-31 RX ORDER — SUCRALFATE 1 G/1
1 TABLET ON AN EMPTY STOMACH TABLET ORAL TWICE A DAY
Qty: 60 | Refills: 0 | Status: ACTIVE | COMMUNITY

## 2023-07-31 NOTE — HPI-HPI
1/16 Patient is feeling well, is doing better with her constipation, with stools softener and diet, colonoscopy positive for internal hemorrhoids, one hyperplastic polyp and one tubular adenoma. 11/15 Patient comes for rectal bleeding, with occasional constipation, patient had a colonoscopy in 2009 in [place], as per patient was normal.  Patient with anemia will plan colonoscopy.   Patient is feeling well, is doing better with her constipation, with stools softener and diet, colonoscopy positive for internal hemorrhoids, one hyperplastic polyp and one tubular adenoma. 8/21 Patient presents to the emergency room 4 days ago complaining of lower abdominal pain and nausea.  Patient has medical history of hypertension, diabetes, and fatty liver,  an abdominal ultrasound that shows evidence of abnormal liver consistent with cirrhosis changes, and a small volume of ascites. Laboratories: Shows anemia hemoglobin 11.2, low sodium 134, elevated blood sugar 179, elevated , , total bilirubin 2.0, low albumin level 3.1, and protein total elevated 8.4. Patient also had a CT scan that showed similar findings. Her last colonoscopy was almost 6 years ago and shows evidence of internal hemorrhoids, a 5 mm tubular adenoma polyp into the cecum, and a 5 mm hyperplastic polyp into the descending colon. Today patient has a coherent conversation but, forgetful at times, looks very anxious, has lower extremity edema, abdomen is distended seems to have small volume of ascites.  Negative for symptoms of GI infection or acute GI bleeding.  Patient complain of feeling tired, sleepy she got loss drive into our office today. No Hx of alcohol abuse. Plan: Patient was instructed not to drive any vehicle for now, education about hepatic encephalopathy was given to her. We will start her on diuretic low-dose of furosemide 20 mg once a day and spironolactone 25 mg once a day.  Patient will have liver work-up which include laboratories below, liver Doppler, FibroScan.  EGD to rule out esophageal/gastric varices/portal gastropathy. Lactulose 15 mL  twice daily. Plan: Patient will have liver work-up, and surveillance colonoscopy, and EGD.  9/21 Patient here today accompanied by her daughter.  Patient recently was admitted to the intensive care unit [facility] with a diagnosis of acute metabolic encephalopathy. EGD showed evidence of portal gastropathy, hiatal hernia, esophageal varices grade 1, normal duodenum. Patient also underwent to diagnostic paracentesis, 0.9 L of ascites fluid was drainage, negative for SBP.  Patient was treated with cephalexin, antibiotic, lactulose, diuretics. Today patient is in good general state. I noted she is in better on her mental status, seems somewhat forgetful but, has a coherent conversation, no edemas, no sign of infection, no sign of acute GI bleeding at this time.  Positive for small amount of ascites. Plan: Continue Xifaxan, lactulose, spironolactone 25 mg with 20 mg of furosemide daily. Patient will complete FibroScan, liver Doppler, and laboratories to complete her liver work-up. With going to plan screening colonoscopy in 3 months.  10/21 FibroScan: Shows evidence of advanced fibrosis/cirrhosis (60 8.7K PA, 183 CAP score). Labs: Alpha-fetoprotein is negative, alpha-1 antitrypsin is negative, ammonia levels are normal 55, CBC platelet levels are normal 158, anemia hemoglobin 11.0 hematocrit 32.9.  Liver enzymes, alkaline phosphatase, and bilirubin are elevated.  , ALT 59, bilirubin total 2.4, albumin 2.8 low, protein total 8.6.  Ferritin level 52, normal viral hepatitis panel negative, autoimmune hepatitis panel negative INR 1.5. Meld score: 16 EGD done a month ago shows evidence of esophageal varices grade I. CT liver 3 PH done 4 months ago shows evidence of early cirrhosis, negative for masses, minimal hiatal hernia. CT abdomen and pelvis with contrast done 2 months ago: Shows evidence of ascites and cirrhotic changes of the liver. Patient here today in good general state, she had normal for her conversation, no sign of acute GI bleeding, infection, edema, ascites. Plan: We will refer her to a tertiary center/hepatology center Continue Xifaxan 550 mg twice a day, furosemide, spironolactone, lactulose, patient on metoprolol. Surveillance esophageal varices EGD  3/22 Patient is here today in good general state, she had normal coherent conversation, no edema, no sign of infection, or GI bleeding.  Laboratory: Alpha-fetoprotein elevated 11.8.  Normal ammonia 57, CBC anemia hemoglobin 11.5, platelet count normal.  CMP: Normal blood sugar and kidney function.  Liver enzymes: , , alkaline phosphatase 353, bilirubin 1.8, low albumin 2.7, normal protein 7.2.  Meld score 10. Patient colonoscopy shows evidence of small internal hemorrhoids otherwise normal. EGD: Esophageal varices grade I /no banding needed. Portal gastropathy, small hiatal hernia, mild gastritis at the antrum with erosion, and normal duodenum. Biopsy results shows evidence of superficial fragment of benign gastric antral mucosa with no specific pathologic alteration, negative for H. pylori. Doppler's ultrasound done on January 2022 shows evidence of liver cirrhosis with persistent small amount of ascites within the upper abdomen. No abnormality evident within the portal vessels. Plan: Patient with continue with diuretic, Xifaxan, will lower dose of lactulose to once a day, if patient tolerated we may DC it. Triphasic CT liver due to elevated alpha-fetoprotein. Keep well control of the diet, weight, sodium intake. Will refer to a hepatologist in [place]. 5/22 Patient here today in good general state.  She has a normal coherent conversation, there is no sign of edema, infection, ascites, or any GI bleeding.  Patient CT scanning abdomen and pelvis shows evidence of: No suspicious liver mass.  Liver cirrhosis, and changes in appearance of the left hepatic lobe, compared with a CT scan done in June 2021 with a wedge shaped region of atrophy and altered perfusion, of uncertain etiology.  The portal vein is currently patent, small volume of perihepatic ascites.  No significant sequela of portal hypertension.  Mild to moderate upper abdominal and retroperitoneal adenopathy, potentially related to chronic liver disease, although is more prominent than typically occur. Patient did visit [facility], she said will have a liver biopsy in a couple of weeks.  She also has follow-up laboratories to complete for them. She has an appointment for a PET scan ordered from her primary doctor. Plan: Patient will continue with lactulose and Xifaxan. We will follow-up her liver biopsy, laboratories, and PET scan. 11/22 Patient here today in good general state.  She has no GI complaints.  She is following her hip pathology in [place] hepatology center.  She said has a diagnosis of autoimmune hepatitis/liver cirrhosis.  She did a treatment with prednisone followed by treatment with azathioprine. Patient had normal: Conversation, no sign of GI bleeding, infection, edema, ascites.  Today accompanied by her  who simply noticed some mental confusion on her, at times.  Patient will continue with treatment Xifaxan, spironolactone, furosemide, lactulose.  We will obtain medical records from [place].  3/23 Patient is here today in a good general state, she has medical history of autoimmune hepatitis with liver cirrhosis.  She has been followed by her hepatology in [place].  She is doing treatment with azathioprine She was stated with a new treatment Ursodiol 200 mg twice a day. Azathioprine 50 mg dose was lowered to half and it will be DC'd soon.  Patient has a normal conversation, there is no sign of ascites, infection, or GI bleeding.  She will continue with her present treatment listed below.  She also will need a 4 phase MRI liver as a hepatocellular carcinoma surveillance protocol.  She also will complete laboratories. 7/23 Today patient is in good general state, she has normal coherent conversation, she has no GI complaints today.  There is no sign of ascites, edema, GI infection, or GI bleeding.  Patient is following with hepatology in [place] she is on Xifaxan 550 mg twice a day, lactulose, azathioprine 50 mg daily and Ursodiol 200 mg twice a day.  Alpha-fetoprotein trending up 10.6, normal kidney function and liver enzymes trending down still slightly elevated AST 38, ALT 32.  PT/INR 1.2/12.3 Hg 13.3, platelets 123,  Patient will see her hepatologist in 2 weeks.  MRI liver shows evidence of cirrhosis without suspicious liver mass, no significant sequela or portal hypertension. Continue follow-up with her hepatology and continue present treatment.  We will do MRI liver in 6 months. EGD cirrhosis/ esophageal varices.

## 2023-08-02 ENCOUNTER — LAB OUTSIDE AN ENCOUNTER (OUTPATIENT)
Dept: URBAN - METROPOLITAN AREA CLINIC 63 | Facility: CLINIC | Age: 63
End: 2023-08-02

## 2023-08-07 ENCOUNTER — LAB OUTSIDE AN ENCOUNTER (OUTPATIENT)
Dept: URBAN - METROPOLITAN AREA CLINIC 63 | Facility: CLINIC | Age: 63
End: 2023-08-07

## 2023-08-08 ENCOUNTER — TELEPHONE ENCOUNTER (OUTPATIENT)
Dept: URBAN - METROPOLITAN AREA CLINIC 64 | Facility: CLINIC | Age: 63
End: 2023-08-08

## 2023-09-20 ENCOUNTER — TELEPHONE ENCOUNTER (OUTPATIENT)
Dept: URBAN - METROPOLITAN AREA CLINIC 3 | Facility: CLINIC | Age: 63
End: 2023-09-20

## 2023-09-21 ENCOUNTER — OUT OF OFFICE VISIT (OUTPATIENT)
Dept: URBAN - METROPOLITAN AREA SURGERY CENTER 4 | Facility: SURGERY CENTER | Age: 63
End: 2023-09-21
Payer: COMMERCIAL

## 2023-09-21 ENCOUNTER — CLAIMS CREATED FROM THE CLAIM WINDOW (OUTPATIENT)
Dept: URBAN - METROPOLITAN AREA CLINIC 4 | Facility: CLINIC | Age: 63
End: 2023-09-21
Payer: COMMERCIAL

## 2023-09-21 DIAGNOSIS — K74.60 CIRRHOSIS, UNSPECIFIED: ICD-10-CM

## 2023-09-21 DIAGNOSIS — K29.50 CHRONIC GASTRITIS WITHOUT BLEEDING: ICD-10-CM

## 2023-09-21 DIAGNOSIS — K44.9 DIAPHRAGMATIC HERNIA WITHOUT OBSTRUCTION OR GANGRENE: ICD-10-CM

## 2023-09-21 DIAGNOSIS — K74.60 CIRRHOSIS: ICD-10-CM

## 2023-09-21 DIAGNOSIS — K29.70 CHRONIC GASTRITIS: ICD-10-CM

## 2023-09-21 DIAGNOSIS — K29.70 GASTRITIS, UNSPECIFIED, WITHOUT BLEEDING: ICD-10-CM

## 2023-09-21 DIAGNOSIS — Z12.11 COLON CANCER SCREENING (HIGH RISK): ICD-10-CM

## 2023-09-21 DIAGNOSIS — K44.9 HIATAL HERNIA: ICD-10-CM

## 2023-09-21 DIAGNOSIS — R19.8 OTHER SPECIFIED SYMPTOMS AND SIGNS INVOLVING THE DIGESTIVE SYSTEM AND ABDOMEN: ICD-10-CM

## 2023-09-21 PROCEDURE — 88305 TISSUE EXAM BY PATHOLOGIST: CPT | Performed by: PATHOLOGY

## 2023-09-21 PROCEDURE — 00731 ANES UPR GI NDSC PX NOS: CPT | Performed by: NURSE ANESTHETIST, CERTIFIED REGISTERED

## 2023-09-21 PROCEDURE — 43239 EGD BIOPSY SINGLE/MULTIPLE: CPT | Performed by: INTERNAL MEDICINE

## 2023-09-21 PROCEDURE — 88312 SPECIAL STAINS GROUP 1: CPT | Performed by: PATHOLOGY

## 2023-09-21 RX ORDER — AZATHIOPRINE
AS DIRECTED POWDER (GRAM) MISCELLANEOUS
Status: ACTIVE | COMMUNITY

## 2023-09-21 RX ORDER — SPIRONOLACTONE 25 MG/1
1 TABLET TABLET, FILM COATED ORAL ONCE A DAY
Qty: 90 TABLET | Refills: 0 | Status: ACTIVE | COMMUNITY
Start: 2023-07-31 | End: 2023-10-29

## 2023-09-21 RX ORDER — LEVOTHYROXINE SODIUM 125 UG/1
TABLET ORAL ONCE A DAY
Refills: 0 | Status: ACTIVE | COMMUNITY
Start: 2022-05-09

## 2023-09-21 RX ORDER — RIFAXIMIN 550 MG/1
1 TABLET TABLET ORAL TWICE A DAY
Qty: 180 TABLET | Refills: 0 | Status: ACTIVE | COMMUNITY
Start: 2023-07-31 | End: 2023-10-29

## 2023-09-21 RX ORDER — LACTULOSE 10 G/15ML
TAKE 30 ML TWICE A DAY SOLUTION ORAL TWICE A DAY
Refills: 0 | Status: ACTIVE | COMMUNITY
Start: 2021-09-17

## 2023-09-21 RX ORDER — HYDROCORTISONE ACETATE 25 MG
ONCE A DAY SUPPOSITORY, RECTAL RECTAL ONCE A DAY
Refills: 3 | Status: ACTIVE | COMMUNITY
Start: 2016-01-04

## 2023-09-21 RX ORDER — PHENYLPROPANOLAMINE HCL 75 MG
TABLET, EXTENDED RELEASE ORAL ONCE A DAY
Refills: 0 | Status: ACTIVE | COMMUNITY
Start: 2022-05-09

## 2023-09-21 RX ORDER — SUCRALFATE 1 G/1
1 TABLET ON AN EMPTY STOMACH TABLET ORAL TWICE A DAY
Qty: 60 | Refills: 0 | Status: ACTIVE | COMMUNITY

## 2023-09-21 RX ORDER — FUROSEMIDE 20 MG/1
1 TABLET TABLET ORAL ONCE A DAY
Qty: 90 TABLET | Refills: 2 | Status: ACTIVE | COMMUNITY
Start: 2022-11-03

## 2023-09-21 RX ORDER — LACTULOSE 10 G/15ML
15 ML SOLUTION ORAL
Qty: 1800 MILLILITER | Refills: 3 | Status: ACTIVE | COMMUNITY
Start: 2023-07-31 | End: 2023-10-29

## 2023-09-21 RX ORDER — AZATHIOPRINE 50 MG/1
AS DIRECTED TABLET ORAL
Qty: 90 TABLETS | Refills: 0 | Status: ACTIVE | COMMUNITY
Start: 2023-07-31

## 2023-09-21 RX ORDER — AMITRIPTYLINE HYDROCHLORIDE 25 MG/1
TABLET, FILM COATED ORAL
Refills: 0 | Status: ACTIVE | COMMUNITY
Start: 2022-05-09

## 2023-09-21 RX ORDER — PANTOPRAZOLE SODIUM 40 MG/1
TABLET, DELAYED RELEASE ORAL ONCE A DAY
Refills: 0 | Status: ACTIVE | COMMUNITY
Start: 2022-05-09

## 2023-09-21 RX ORDER — METOPROLOL SUCCINATE 25 MG/1
TAKE 1 CAPSULE BY MOUTH EVERY DAY FOR 30 DAYS CAPSULE, EXTENDED RELEASE ORAL
Qty: 90 CAPSULE | Refills: 1 | Status: ACTIVE | COMMUNITY

## 2023-09-21 RX ORDER — FOLIC ACID 1 MG/1
TABLET ORAL ONCE A DAY
Refills: 0 | Status: ACTIVE | COMMUNITY
Start: 2022-05-09

## 2023-09-21 RX ORDER — LACTULOSE 10 G/15ML
TAKE 15 ML BY MOUTH DAILY AS NEEDED SOLUTION ORAL
Qty: 1350 MILLILITER | Refills: 1 | Status: ACTIVE | COMMUNITY

## 2023-09-21 RX ORDER — URSODIOL 250 MG/1
TAKE 1 TABLET BY MOUTH TWICE A DAY WITH FOOD FOR 90 DAYS TABLET ORAL
Qty: 180 TABLET | Refills: 0 | Status: ACTIVE | COMMUNITY

## 2023-09-21 RX ORDER — METOPROLOL SUCCINATE 25 MG/1
1 TABLET TABLET, FILM COATED, EXTENDED RELEASE ORAL
Qty: 90 TABLETS | Refills: 0 | Status: ACTIVE | COMMUNITY
Start: 2023-07-31

## 2023-09-21 RX ORDER — SPIRONOLACTONE 25 MG/1
ONCE A DAY TABLET ORAL ONCE A DAY
Refills: 0 | Status: ACTIVE | COMMUNITY
Start: 2022-03-28

## 2023-09-21 RX ORDER — FUROSEMIDE 20 MG/1
ONCE A DAY TABLET ORAL ONCE A DAY
Refills: 0 | Status: ACTIVE | COMMUNITY
Start: 2021-12-01

## 2023-09-21 RX ORDER — RIFAXIMIN 550 MG/1
TWICE A DAY TABLET ORAL TWICE A DAY
Refills: 0 | Status: ACTIVE | COMMUNITY
Start: 2021-12-09

## 2023-09-21 RX ORDER — FUROSEMIDE 20 MG/1
1 TABLET TABLET ORAL ONCE A DAY
Qty: 30 | Status: ACTIVE | COMMUNITY
Start: 2023-03-13

## 2023-09-21 RX ORDER — FUROSEMIDE 20 MG/1
1 TABLET TABLET ORAL ONCE A DAY
Qty: 90 TABLET | Refills: 0 | Status: ACTIVE | COMMUNITY
Start: 2023-07-31

## 2023-10-18 ENCOUNTER — P2P PATIENT RECORD (OUTPATIENT)
Age: 63
End: 2023-10-18

## 2023-10-24 ENCOUNTER — ERX REFILL RESPONSE (OUTPATIENT)
Dept: URBAN - METROPOLITAN AREA CLINIC 63 | Facility: CLINIC | Age: 63
End: 2023-10-24

## 2023-10-24 RX ORDER — URSODIOL 250 MG/1
TAKE 1 TABLET BY MOUTH TWICE A DAY WITH FOOD FOR 90 DAYS TABLET ORAL
Qty: 180 TABLET | Refills: 0 | OUTPATIENT

## 2023-11-30 ENCOUNTER — ERX REFILL RESPONSE (OUTPATIENT)
Dept: URBAN - METROPOLITAN AREA CLINIC 63 | Facility: CLINIC | Age: 63
End: 2023-11-30

## 2023-11-30 RX ORDER — RIFAXIMIN 550 MG/1
TWICE A DAY TABLET ORAL TWICE A DAY
Refills: 0 | OUTPATIENT

## 2023-11-30 RX ORDER — RIFAXIMIN 550 MG/1
TAKE 1 TABLET BY MOUTH TWICE A DAY FOR 90 DAYS TABLET ORAL
Qty: 120 TABLET | Refills: 1 | OUTPATIENT

## 2023-12-14 ENCOUNTER — ERX REFILL RESPONSE (OUTPATIENT)
Dept: URBAN - METROPOLITAN AREA CLINIC 63 | Facility: CLINIC | Age: 63
End: 2023-12-14

## 2023-12-14 RX ORDER — FUROSEMIDE 20 MG/1
1 TABLET TABLET ORAL ONCE A DAY
Qty: 90 TABLET | Refills: 0 | OUTPATIENT

## 2023-12-14 RX ORDER — FUROSEMIDE 20 MG/1
TAKE 1 TABLET BY MOUTH EVERY DAY FOR 90 DAYS TABLET ORAL
Qty: 90 TABLET | Refills: 0 | OUTPATIENT

## 2024-01-04 ENCOUNTER — ERX REFILL RESPONSE (OUTPATIENT)
Dept: URBAN - METROPOLITAN AREA CLINIC 63 | Facility: CLINIC | Age: 64
End: 2024-01-04

## 2024-01-04 RX ORDER — SPIRONOLACTONE 25 MG/1
ONCE A DAY TABLET ORAL ONCE A DAY
Refills: 0 | OUTPATIENT

## 2024-01-04 RX ORDER — URSODIOL 250 MG/1
TAKE 1 TABLET BY MOUTH TWICE A DAY WITH FOOD FOR 90 DAYS TABLET ORAL
Qty: 180 TABLET | Refills: 0 | OUTPATIENT

## 2024-01-04 RX ORDER — SPIRONOLACTONE 25 MG/1
TAKE 1 TABLET BY MOUTH EVERY DAY FOR 90 DAYS TABLET ORAL
Qty: 90 TABLET | Refills: 0 | OUTPATIENT

## 2024-01-12 ENCOUNTER — P2P PATIENT RECORD (OUTPATIENT)
Age: 64
End: 2024-01-12

## 2024-06-11 ENCOUNTER — ERX REFILL RESPONSE (OUTPATIENT)
Dept: URBAN - METROPOLITAN AREA CLINIC 63 | Facility: CLINIC | Age: 64
End: 2024-06-11

## 2024-06-11 RX ORDER — FUROSEMIDE 20 MG/1
TAKE 1 TABLET BY MOUTH EVERY DAY FOR 90 DAYS TABLET ORAL
Qty: 90 TABLET | Refills: 0 | OUTPATIENT

## 2024-10-02 ENCOUNTER — ERX REFILL RESPONSE (OUTPATIENT)
Dept: URBAN - METROPOLITAN AREA CLINIC 63 | Facility: CLINIC | Age: 64
End: 2024-10-02

## 2024-10-02 RX ORDER — FUROSEMIDE 20 MG/1
TAKE 1 TABLET BY MOUTH EVERY DAY FOR 90 DAYS TABLET ORAL
Qty: 90 TABLET | Refills: 0 | OUTPATIENT

## 2024-10-02 RX ORDER — FUROSEMIDE 20 MG/1
TAKE 1 TABLET BY MOUTH EVERY DAY TABLET ORAL
Qty: 90 TABLET | Refills: 0 | OUTPATIENT

## 2024-10-21 ENCOUNTER — ERX REFILL RESPONSE (OUTPATIENT)
Dept: URBAN - METROPOLITAN AREA CLINIC 63 | Facility: CLINIC | Age: 64
End: 2024-10-21

## 2024-10-21 RX ORDER — SPIRONOLACTONE 25 MG/1
TAKE 1 TABLET BY MOUTH EVERY DAY FOR 90 DAYS TABLET ORAL
Qty: 90 TABLET | Refills: 0 | OUTPATIENT

## 2024-10-21 RX ORDER — SPIRONOLACTONE 25 MG/1
TAKE 1 TABLET BY MOUTH EVERY DAY TABLET ORAL
Qty: 90 TABLET | Refills: 0 | OUTPATIENT

## 2024-10-28 ENCOUNTER — ERX REFILL RESPONSE (OUTPATIENT)
Dept: URBAN - METROPOLITAN AREA CLINIC 63 | Facility: CLINIC | Age: 64
End: 2024-10-28

## 2024-10-28 RX ORDER — RIFAXIMIN 550 MG/1
TAKE 1 TABLET BY MOUTH TWICE A DAY FOR 90 DAYS TABLET ORAL
Qty: 120 TABLET | Refills: 1 | OUTPATIENT

## 2024-11-12 ENCOUNTER — ERX REFILL RESPONSE (OUTPATIENT)
Dept: URBAN - METROPOLITAN AREA CLINIC 63 | Facility: CLINIC | Age: 64
End: 2024-11-12

## 2024-11-12 RX ORDER — URSODIOL 250 MG/1
TAKE 1 TABLET BY MOUTH TWICE A DAY WITH FOOD FOR 90 DAYS TABLET ORAL
Qty: 180 TABLET | Refills: 1 | OUTPATIENT

## 2025-02-11 ENCOUNTER — ERX REFILL RESPONSE (OUTPATIENT)
Dept: URBAN - METROPOLITAN AREA CLINIC 63 | Facility: CLINIC | Age: 65
End: 2025-02-11

## 2025-02-11 RX ORDER — FUROSEMIDE 20 MG/1
TAKE 1 TABLET BY MOUTH EVERY DAY TABLET ORAL
Qty: 90 TABLET | Refills: 0 | OUTPATIENT

## 2025-02-14 ENCOUNTER — P2P PATIENT RECORD (OUTPATIENT)
Age: 65
End: 2025-02-14

## 2025-03-16 ENCOUNTER — ERX REFILL RESPONSE (OUTPATIENT)
Dept: URBAN - METROPOLITAN AREA CLINIC 63 | Facility: CLINIC | Age: 65
End: 2025-03-16

## 2025-03-16 RX ORDER — LACTULOSE 10 G/15ML
TAKE 15 ML ORALLY UP TO 4 TIMES DAILY - TITRATE FOR 2-3 BOWEL MOVEMENT DAILY 30 DAYS SOLUTION ORAL
Qty: 5400 MILLILITER | Refills: 1 | OUTPATIENT

## 2025-05-20 ENCOUNTER — ERX REFILL RESPONSE (OUTPATIENT)
Dept: URBAN - METROPOLITAN AREA CLINIC 63 | Facility: CLINIC | Age: 65
End: 2025-05-20

## 2025-05-20 RX ORDER — FUROSEMIDE 20 MG/1
TAKE 1 TABLET BY MOUTH EVERY DAY TABLET ORAL
Qty: 90 TABLET | Refills: 0

## 2025-06-27 ENCOUNTER — ERX REFILL RESPONSE (OUTPATIENT)
Dept: URBAN - METROPOLITAN AREA CLINIC 63 | Facility: CLINIC | Age: 65
End: 2025-06-27

## 2025-06-27 RX ORDER — URSODIOL 250 MG/1
TAKE 1 TABLET BY MOUTH TWICE A DAY WITH FOOD FOR 90 DAYS TABLET ORAL
Qty: 180 TABLET | Refills: 1

## 2025-08-25 ENCOUNTER — ERX REFILL RESPONSE (OUTPATIENT)
Dept: URBAN - METROPOLITAN AREA CLINIC 63 | Facility: CLINIC | Age: 65
End: 2025-08-25

## 2025-08-25 RX ORDER — URSODIOL 250 MG/1
TAKE 1 TABLET BY MOUTH TWICE A DAY WITH FOOD FOR 90 DAYS TABLET ORAL
Qty: 180 TABLET | Refills: 1 | OUTPATIENT

## 2025-08-25 RX ORDER — URSODIOL 250 MG/1
TAKE 1 TABLET BY MOUTH TWICE A DAY WITH FOOD FOR 90 DAYS TABLET ORAL
Qty: 180 TABLET | Refills: 2 | OUTPATIENT